# Patient Record
Sex: FEMALE | URBAN - METROPOLITAN AREA
[De-identification: names, ages, dates, MRNs, and addresses within clinical notes are randomized per-mention and may not be internally consistent; named-entity substitution may affect disease eponyms.]

---

## 2018-11-19 ENCOUNTER — APPOINTMENT (OUTPATIENT)
Dept: URBAN - METROPOLITAN AREA CLINIC 186 | Age: 63
Setting detail: DERMATOLOGY
End: 2018-11-19

## 2018-11-19 DIAGNOSIS — L81.4 OTHER MELANIN HYPERPIGMENTATION: ICD-10-CM

## 2018-11-19 DIAGNOSIS — L28.1 PRURIGO NODULARIS: ICD-10-CM

## 2018-11-19 DIAGNOSIS — D18.0 HEMANGIOMA: ICD-10-CM

## 2018-11-19 DIAGNOSIS — L57.8 OTHER SKIN CHANGES DUE TO CHRONIC EXPOSURE TO NONIONIZING RADIATION: ICD-10-CM

## 2018-11-19 DIAGNOSIS — D22 MELANOCYTIC NEVI: ICD-10-CM

## 2018-11-19 DIAGNOSIS — L82.0 INFLAMED SEBORRHEIC KERATOSIS: ICD-10-CM

## 2018-11-19 DIAGNOSIS — L82.1 OTHER SEBORRHEIC KERATOSIS: ICD-10-CM

## 2018-11-19 PROBLEM — D18.01 HEMANGIOMA OF SKIN AND SUBCUTANEOUS TISSUE: Status: ACTIVE | Noted: 2018-11-19

## 2018-11-19 PROBLEM — E78.5 HYPERLIPIDEMIA, UNSPECIFIED: Status: ACTIVE | Noted: 2018-11-19

## 2018-11-19 PROBLEM — D22.5 MELANOCYTIC NEVI OF TRUNK: Status: ACTIVE | Noted: 2018-11-19

## 2018-11-19 PROCEDURE — OTHER NOTED ON EXAM BUT NOT TREATED: OTHER

## 2018-11-19 PROCEDURE — OTHER TREATMENT REGIMEN: OTHER

## 2018-11-19 PROCEDURE — OTHER OBSERVATION: OTHER

## 2018-11-19 PROCEDURE — OTHER COUNSELING: OTHER

## 2018-11-19 PROCEDURE — OTHER PRESCRIPTION: OTHER

## 2018-11-19 PROCEDURE — 99214 OFFICE O/P EST MOD 30 MIN: CPT

## 2018-11-19 PROCEDURE — OTHER BLEACH BATH INSTRUCTIONS: OTHER

## 2018-11-19 RX ORDER — DESOXIMETASONE 2.5 MG/G
CREAM TOPICAL
Qty: 1 | Refills: 0 | Status: ERX | COMMUNITY
Start: 2018-11-19

## 2018-11-19 ASSESSMENT — LOCATION DETAILED DESCRIPTION DERM
LOCATION DETAILED: RIGHT MEDIAL SUPERIOR CHEST
LOCATION DETAILED: RIGHT MEDIAL FOREHEAD
LOCATION DETAILED: RIGHT INFERIOR UPPER BACK
LOCATION DETAILED: LEFT SUPERIOR UPPER BACK
LOCATION DETAILED: RIGHT DISTAL DORSAL FOREARM
LOCATION DETAILED: RIGHT MEDIAL FOREHEAD
LOCATION DETAILED: EPIGASTRIC SKIN

## 2018-11-19 ASSESSMENT — LOCATION SIMPLE DESCRIPTION DERM
LOCATION SIMPLE: LEFT UPPER BACK
LOCATION SIMPLE: RIGHT FOREHEAD
LOCATION SIMPLE: ABDOMEN
LOCATION SIMPLE: RIGHT FOREHEAD
LOCATION SIMPLE: CHEST
LOCATION SIMPLE: RIGHT UPPER BACK
LOCATION SIMPLE: RIGHT FOREARM

## 2018-11-19 ASSESSMENT — LOCATION ZONE DERM
LOCATION ZONE: TRUNK
LOCATION ZONE: TRUNK
LOCATION ZONE: FACE
LOCATION ZONE: ARM
LOCATION ZONE: FACE

## 2018-11-19 NOTE — PROCEDURE: BLEACH BATH INSTRUCTIONS
[facility]: I recommended weekly bleach baths. This is accomplished by filling a bathtub with warm to hot water and then adding in 1/4 cup of bleach. The patient should then soak for 15-20 minutes and then rinse off.\\nor 12 drops in one quart bottle spritz twice a week
Detail Level: Detailed

## 2018-11-19 NOTE — PROCEDURE: TREATMENT REGIMEN
Initiate Treatment: Desoxemetasone 0.25% cream twice daily for up to 1 month or until resolved for raised lesions  then 1-2 times weekly as maintenance
Detail Level: Zone

## 2022-02-10 ENCOUNTER — COMPREHENSIVE EXAM (OUTPATIENT)
Dept: URBAN - METROPOLITAN AREA CLINIC 36 | Facility: CLINIC | Age: 67
End: 2022-02-10

## 2022-02-10 DIAGNOSIS — H25.811: ICD-10-CM

## 2022-02-10 DIAGNOSIS — H52.7: ICD-10-CM

## 2022-02-10 DIAGNOSIS — H25.812: ICD-10-CM

## 2022-02-10 DIAGNOSIS — H04.123: ICD-10-CM

## 2022-02-10 PROCEDURE — 92014 COMPRE OPH EXAM EST PT 1/>: CPT

## 2022-02-10 PROCEDURE — 92015 DETERMINE REFRACTIVE STATE: CPT

## 2022-02-10 ASSESSMENT — VISUAL ACUITY
OD_CC: 20/20-2
OD_CC: J1+
OD_SC: 20/25+2
OS_CC: J1+
OS_SC: J6
OD_SC: J5
OS_SC: 20/25-1
OS_CC: 20/25+1

## 2022-02-10 ASSESSMENT — TONOMETRY
OD_IOP_MMHG: 20
OS_IOP_MMHG: 17

## 2025-01-10 ENCOUNTER — COMPREHENSIVE EXAM (OUTPATIENT)
Age: 70
End: 2025-01-10

## 2025-01-10 DIAGNOSIS — H18.593: ICD-10-CM

## 2025-01-10 DIAGNOSIS — H52.7: ICD-10-CM

## 2025-01-10 DIAGNOSIS — H04.123: ICD-10-CM

## 2025-01-10 DIAGNOSIS — H25.813: ICD-10-CM

## 2025-01-10 PROCEDURE — 92014 COMPRE OPH EXAM EST PT 1/>: CPT

## 2025-01-10 PROCEDURE — 92015 DETERMINE REFRACTIVE STATE: CPT
